# Patient Record
Sex: MALE | Race: WHITE | NOT HISPANIC OR LATINO | Employment: FULL TIME | URBAN - METROPOLITAN AREA
[De-identification: names, ages, dates, MRNs, and addresses within clinical notes are randomized per-mention and may not be internally consistent; named-entity substitution may affect disease eponyms.]

---

## 2018-11-23 ENCOUNTER — HISTORICAL (OUTPATIENT)
Dept: RADIOLOGY | Facility: HOSPITAL | Age: 58
End: 2018-11-23

## 2019-05-09 ENCOUNTER — HISTORICAL (OUTPATIENT)
Dept: LAB | Facility: HOSPITAL | Age: 59
End: 2019-05-09

## 2019-05-09 LAB
EST. AVERAGE GLUCOSE BLD GHB EST-MCNC: 111 MG/DL
HBA1C MFR BLD: 5.5 % (ref 4.2–6.3)

## 2019-10-30 ENCOUNTER — HISTORICAL (OUTPATIENT)
Dept: RADIOLOGY | Facility: HOSPITAL | Age: 59
End: 2019-10-30

## 2019-10-30 LAB
BUN SERPL-MCNC: 10 MG/DL (ref 7–18)
CALCIUM SERPL-MCNC: 9 MG/DL (ref 8.5–10.1)
CHLORIDE SERPL-SCNC: 102 MMOL/L (ref 98–107)
CO2 SERPL-SCNC: 28 MMOL/L (ref 21–32)
CREAT SERPL-MCNC: 0.82 MG/DL (ref 0.7–1.3)
CREAT/UREA NIT SERPL: 12.2
GLUCOSE SERPL-MCNC: 87 MG/DL (ref 74–106)
POC CREATININE: 0.8 MG/DL (ref 0.6–1.3)
POTASSIUM SERPL-SCNC: 4.1 MMOL/L (ref 3.5–5.1)
SODIUM SERPL-SCNC: 138 MMOL/L (ref 136–145)

## 2021-05-17 ENCOUNTER — HISTORICAL (OUTPATIENT)
Dept: ADMINISTRATIVE | Facility: HOSPITAL | Age: 61
End: 2021-05-17

## 2021-05-17 LAB
ABS NEUT (OLG): 3.6 X10(3)/MCL (ref 2.1–9.2)
ALBUMIN SERPL-MCNC: 4.5 GM/DL (ref 3.4–5)
ALBUMIN/GLOB SERPL: 1.8 {RATIO} (ref 1.5–2.5)
ALP SERPL-CCNC: 55 UNIT/L (ref 38–126)
ALT SERPL-CCNC: 17 UNIT/L (ref 7–52)
AST SERPL-CCNC: 22 UNIT/L (ref 15–37)
BILIRUB SERPL-MCNC: 0.7 MG/DL (ref 0.2–1)
BILIRUBIN DIRECT+TOT PNL SERPL-MCNC: 0.2 MG/DL (ref 0–0.5)
BILIRUBIN DIRECT+TOT PNL SERPL-MCNC: 0.5 MG/DL
BUN SERPL-MCNC: 11 MG/DL (ref 7–18)
CALCIUM SERPL-MCNC: 9.2 MG/DL (ref 8.5–10.1)
CHLORIDE SERPL-SCNC: 102 MMOL/L (ref 98–107)
CHOLEST SERPL-MCNC: 186 MG/DL (ref 0–200)
CHOLEST/HDLC SERPL: 4.5 {RATIO}
CO2 SERPL-SCNC: 27 MMOL/L (ref 21–32)
CREAT SERPL-MCNC: 0.8 MG/DL (ref 0.6–1.3)
DEPRECATED CALCIDIOL+CALCIFEROL SERPL-MC: 28.5 NG/ML (ref 30–80)
ERYTHROCYTE [DISTWIDTH] IN BLOOD BY AUTOMATED COUNT: 12.3 % (ref 11.5–17)
EST CREAT CLEARANCE SER (OHS): 158.02 ML/MIN
GLOBULIN SER-MCNC: 2.5 GM/DL (ref 1.2–3)
GLUCOSE SERPL-MCNC: 102 MG/DL (ref 74–106)
HCT VFR BLD AUTO: 44.8 % (ref 42–52)
HDLC SERPL-MCNC: 41 MG/DL (ref 35–60)
HGB BLD-MCNC: 15.1 GM/DL (ref 14–18)
LDLC SERPL CALC-MCNC: 128 MG/DL (ref 0–129)
LYMPHOCYTES # BLD AUTO: 2.5 X10(3)/MCL (ref 0.6–3.4)
LYMPHOCYTES NFR BLD AUTO: 38 % (ref 13–40)
MCH RBC QN AUTO: 28 PG (ref 27–31.2)
MCHC RBC AUTO-ENTMCNC: 34 GM/DL (ref 32–36)
MCV RBC AUTO: 83 FL (ref 80–94)
MONOCYTES # BLD AUTO: 0.5 X10(3)/MCL (ref 0.1–1.3)
MONOCYTES NFR BLD AUTO: 7.8 % (ref 0.1–24)
NEUTROPHILS NFR BLD AUTO: 54.2 % (ref 47–80)
PLATELET # BLD AUTO: 233 X10(3)/MCL (ref 130–400)
PMV BLD AUTO: 8.9 FL (ref 9.4–12.4)
POTASSIUM SERPL-SCNC: 4.4 MMOL/L (ref 3.5–5.1)
PROT SERPL-MCNC: 7 GM/DL (ref 6.4–8.2)
PSA SERPL-MCNC: 2.18 NG/ML (ref 0–3.5)
RBC # BLD AUTO: 5.4 X10(6)/MCL (ref 4.7–6.1)
SODIUM SERPL-SCNC: 140 MMOL/L (ref 136–145)
TRIGL SERPL-MCNC: 205 MG/DL (ref 30–150)
TSH SERPL-ACNC: 1.44 MIU/ML (ref 0.35–4.94)
VLDLC SERPL CALC-MCNC: 41 MG/DL
WBC # SPEC AUTO: 6.6 X10(3)/MCL (ref 4.5–11.5)

## 2021-09-29 ENCOUNTER — HISTORICAL (OUTPATIENT)
Dept: ADMINISTRATIVE | Facility: HOSPITAL | Age: 61
End: 2021-09-29

## 2022-04-09 ENCOUNTER — HISTORICAL (OUTPATIENT)
Dept: ADMINISTRATIVE | Facility: HOSPITAL | Age: 62
End: 2022-04-09

## 2022-04-26 VITALS
WEIGHT: 264.56 LBS | SYSTOLIC BLOOD PRESSURE: 146 MMHG | HEIGHT: 69 IN | BODY MASS INDEX: 39.18 KG/M2 | DIASTOLIC BLOOD PRESSURE: 84 MMHG | OXYGEN SATURATION: 95 %

## 2022-05-02 NOTE — HISTORICAL [facility] CERNER
This is a historical note converted from Robert. Formatting and pictures may have been removed.  Please reference Robert for original formatting and attached multimedia. Chief Complaint  wellness  History of Present Illness  Dayana brother  Works as a  at [employer] ([place]?previously worked at [employer] for many years  was?recently put on venlafaxine for mild?anxiety and depression?from dr long in [place]  took it?for days but discontinued it due to nausea and dizziness  home BPs around 140/80  did colonoscopy and egd with dr monzon?but does not know if he is due for repeat:  seeing dr cornell for right knee pain test  Not really exercising due to knee pain  Diet is variable,?was on a low-carb diet?but?has not been doing anything in particular lately  Due for PSA  has constant fatigue, excessive daytime sleepiness,?trouble staying awake although he is able to drive without problem,?loud snoring  States he was diagnosed with thyroid trouble in the past and previously took medicine?but that it seemed to resolve on its own so he was able to get off of the medicine but has not been checked in a long time  States he was diagnosed with vitamin D deficiency in the past but apparently there was a?difficult time?keeping his level up without the 50,000 unit dose, may need to stay on that  Review of Systems  ?14 point Review of Systems performed with no exceptions for new complaints other than as noted in HPI.  Physical Exam  Vitals & Measurements  HR:?78(Peripheral)? BP:?152/90?  HT:?175.00?cm? HT:?175?cm? WT:?111.000?kg? WT:?111?kg? BMI:?36.24?  ?  PHYSICAL EXAM  ?   WDWN patient in NAD, ?AFVSS  HEENT - no acute abnormality  ??????????????? oropharynx WNL  HEART - RRR  LUNGS -? CTA  ABDOMEN - benign, NTND;? no peritoneal signs  EXTREMITIES - No CCE  SKIN - warm, dry, intact  PSYCH - affect appropriate;? alert and oriented  NEURO- no new deficits noted;? cranial nerves grossly intact  ?  Assessment/Plan  1.?Wellness  examination?Z00.00  ?Work on diet and exercise  Check on: Status  Check PSA today  Ordered:  Comprehensive Metabolic Panel, Routine collect, 05/17/21 9:27:00 CDT, Blood, Stop date 05/17/21 9:27:00 CDT, Lab Collect, Wellness examination, 05/17/21 9:27:00 CDT  Lipid Panel, Routine collect, 05/17/21 9:27:00 CDT, Blood, Stop date 05/17/21 9:27:00 CDT, Lab Collect, Wellness examination, 05/17/21 9:27:00 CDT  Preventative Health Care Est 40-64 years [id number] PC, Wellness examination, [facility] AMB - AFP, 05/17/21 9:22:00 CDT  Prostate Specific Antigen, Routine collect, 05/17/21 9:27:00 CDT, Blood, Stop date 05/17/21 9:27:00 CDT, Lab Collect, Wellness examination, 05/17/21 9:27:00 CDT  Thyroid Stimulating Hormone, Routine collect, 05/17/21 9:27:00 CDT, Blood, Stop date 05/17/21 9:27:00 CDT, Lab Collect, Wellness examination  Hypothyroidism, 05/17/21 9:27:00 CDT  ?  2.?Elevated blood pressure reading without diagnosis of hypertension?R03.0,?Elevated BP without diagnosis of hypertension?R03.0  ?Home blood pressures,?will have him come back in?about a month and see how he is doing  Ordered:  Clinic Follow up, *Est. 06/28/21 14:00:00 CDT, Order for future visit, Elevated blood pressure reading without diagnosis of hypertension, HLink AFP  ?  3.?GERD (gastroesophageal reflux disease)?K21.9  ?  4.?Anxiety with depression?F41.8  ?Try restarting venlafaxine at every other day?then increase to daily as tolerated  ?  6.?Hypothyroidism?E03.9  ?We will recheck?TSH  Ordered:  Thyroid Stimulating Hormone, Routine collect, 05/17/21 9:27:00 CDT, Blood, Stop date 05/17/21 9:27:00 CDT, Lab Collect, Wellness examination  Hypothyroidism, 05/17/21 9:27:00 CDT  ?  7.?Vitamin D deficiency?E55.9  ?Read?check level, may need to just stay on 50,000 units weekly?if he is low again  Ordered:  Vitamin D, 25-Hydroxy Level, Routine collect, 05/17/21 9:27:00 CDT, Blood, Stop date 05/17/21 9:27:00 CDT, Lab Collect, Vitamin D deficiency, 05/17/21 9:27:00  CDT  ?  Referrals  External Referral, fatigue, home sleep study, usual, 05/17/21 10:17:00 CDT, Fatigue  Clinic Follow up, *Est. 06/28/21 14:00:00 CDT, Order for future visit, Elevated blood pressure reading without diagnosis of hypertension, [facility] AFP   Problem List/Past Medical History  Ongoing  2019-nCoV  Anxiety with depression  De Quervains tenosynovitis, right  Elevated blood pressure reading without diagnosis of hypertension  Elevated BP without diagnosis of hypertension  GERD (gastroesophageal reflux disease)  Hypothyroidism  Obesity  Screening for cardiovascular condition  Screening for prostate cancer  Tendinitis of thumb  Vitamin D deficiency  Wellness examination  Historical  No qualifying data  Procedure/Surgical History  Colonoscopy (2015)  Colonoscopy (2015)  Arm  Dislocation, jaw  Injury, knee  Lesser toe structure   Medications  Fish Oil 1200 mg oral capsule, 1200 mg= 1 cap(s), Oral, TID  Pantoprazole 40 mg ORAL EC-Tablet, 40 mg= 1 tab(s), Oral, Daily  Vitamin D3 5000 intl units (125 mcg) oral capsule, 5000 IntUnit= 1 cap(s), Oral, Daily  Zofran 8 mg oral tablet, 8 mg= 1 tab(s), Oral, BID, PRN, 1 refills  Allergies  codeine?(Itching)  Social History  Abuse/Neglect  No, No, Yes, 05/17/2021  Alcohol  Never, 01/22/2016  Employment/School  Employed, 01/22/2016  Exercise  Exercise duration: 45. Exercise type: Walking., 01/22/2016  Financial/Legal Situation  None, 04/27/2021  Home/Environment  Lives with Children, Spouse., 01/22/2016  Nutrition/Health  Type of diet: Regular Diet. Regular, 01/22/2016  Sexual  Sexually active: Yes., 01/22/2016  Spiritual/Cultural  None, 04/27/2021  Substance Use  Never, 01/22/2016  Tobacco  Never (less than 100 in lifetime), N/A, Smokeless Tobacco Use: Former smokeless tobacco user, quit more than 30 days ago., 05/17/2021  Family History  COPD (chronic obstructive pulmonary disease).: Mother.  Diabetes mellitus type 2: Mother.  Hyperlipidemia.: Mother.  Hypertension.:  Father.  Immunizations  Vaccine Date Status   influenza virus vaccine, inactivated 01/22/2016 Given   Health Maintenance  Health Maintenance  ???Pending?(in the next year)  ??? ??OverDue  ??? ? ? ?Influenza Vaccine due??10/01/20??and every 1??day(s)  ??? ??Due?  ??? ? ? ?Lipid Screening due??05/17/21??Unknown Frequency  ??? ? ? ?Zoster Vaccine due??05/17/21??Unknown Frequency  ??? ??Refused?  ??? ? ? ?Tetanus Vaccine due??05/17/21??and every 10??year(s)  ??? ??Due In Future?  ??? ? ? ?Obesity Screening not due until??01/01/22??and every 1??year(s)  ??? ? ? ?Alcohol Misuse Screening not due until??01/02/22??and every 1??year(s)  ??? ? ? ?Depression Screening not due until??04/27/22??and every 1??year(s)  ??? ? ? ?Aspirin Therapy for CVD Prevention not due until??04/27/22??and every 1??year(s)  ??? ? ? ?ADL Screening not due until??04/27/22??and every 1??year(s)  ???Satisfied?(in the past 1 year)  ??? ??Satisfied?  ??? ? ? ?ADL Screening on??04/27/21.??Satisfied by Edda Olguin LPN  ??? ? ? ?Alcohol Misuse Screening on??04/27/21.??Satisfied by Edda Olguin LPN  ??? ? ? ?Aspirin Therapy for CVD Prevention on??04/27/21.??Satisfied by Edda Olguin LPN  ??? ? ? ?Blood Pressure Screening on??05/17/21.??Satisfied by Mihir Lobato MD  ??? ? ? ?Body Mass Index Check on??05/17/21.??Satisfied by Maria Esther Dowell LPN  ??? ? ? ?Depression Screening on??04/27/21.??Satisfied by Edda Olguin LPN  ??? ? ? ?Obesity Screening on??05/17/21.??Satisfied by Maria Esther Dowell LPN  ??? ??Refused?  ??? ? ? ?Tetanus Vaccine on??04/27/21.??Recorded by Edda Olguin LPN  ??? ? ? ?Zoster Vaccine on??04/27/21.??Recorded by Edda Olguin LPN  ?

## 2022-05-18 PROBLEM — F41.8 MIXED ANXIETY DEPRESSIVE DISORDER: Status: ACTIVE | Noted: 2022-05-18

## 2022-05-18 PROBLEM — K21.9 GASTROESOPHAGEAL REFLUX DISEASE: Status: ACTIVE | Noted: 2022-05-18

## 2022-05-18 PROBLEM — E03.9 HYPOTHYROIDISM: Status: ACTIVE | Noted: 2022-05-18

## 2022-05-18 PROBLEM — M65.4 RADIAL STYLOID TENOSYNOVITIS OF RIGHT HAND: Status: ACTIVE | Noted: 2022-05-18

## 2022-05-18 PROBLEM — E55.9 VITAMIN D DEFICIENCY: Status: ACTIVE | Noted: 2022-05-18

## 2022-05-18 PROBLEM — E66.9 OBESITY: Status: ACTIVE | Noted: 2022-05-18

## 2022-05-19 PROBLEM — Z00.00 ENCOUNTER FOR WELLNESS EXAMINATION: Status: ACTIVE | Noted: 2022-05-19

## 2022-08-22 PROBLEM — Z00.00 ENCOUNTER FOR WELLNESS EXAMINATION: Status: RESOLVED | Noted: 2022-05-19 | Resolved: 2022-08-22

## 2023-06-12 PROBLEM — R60.9 EDEMA: Status: ACTIVE | Noted: 2023-06-12

## 2023-06-12 PROBLEM — G47.33 OSA ON CPAP: Status: ACTIVE | Noted: 2023-06-12

## 2023-06-12 PROBLEM — Z28.21 IMMUNIZATION DECLINED: Status: ACTIVE | Noted: 2023-06-12

## 2023-06-12 PROCEDURE — 86803 HEPATITIS C AB TEST: CPT | Performed by: FAMILY MEDICINE

## 2023-06-12 PROCEDURE — 87389 HIV-1 AG W/HIV-1&-2 AB AG IA: CPT | Performed by: FAMILY MEDICINE

## 2023-07-11 ENCOUNTER — OFFICE VISIT (OUTPATIENT)
Dept: ORTHOPEDICS | Facility: CLINIC | Age: 63
End: 2023-07-11
Payer: COMMERCIAL

## 2023-07-11 ENCOUNTER — HOSPITAL ENCOUNTER (OUTPATIENT)
Dept: RADIOLOGY | Facility: CLINIC | Age: 63
Discharge: HOME | End: 2023-07-11
Attending: SPECIALIST
Payer: COMMERCIAL

## 2023-07-11 VITALS
HEART RATE: 56 BPM | HEIGHT: 68 IN | SYSTOLIC BLOOD PRESSURE: 160 MMHG | DIASTOLIC BLOOD PRESSURE: 98 MMHG | WEIGHT: 246 LBS | BODY MASS INDEX: 37.28 KG/M2

## 2023-07-11 DIAGNOSIS — M25.561 RIGHT KNEE PAIN, UNSPECIFIED CHRONICITY: ICD-10-CM

## 2023-07-11 DIAGNOSIS — M25.561 RIGHT KNEE PAIN, UNSPECIFIED CHRONICITY: Primary | ICD-10-CM

## 2023-07-11 DIAGNOSIS — M17.11 PRIMARY OSTEOARTHRITIS OF RIGHT KNEE: ICD-10-CM

## 2023-07-11 PROCEDURE — 3077F SYST BP >= 140 MM HG: CPT | Mod: CPTII,,, | Performed by: PHYSICIAN ASSISTANT

## 2023-07-11 PROCEDURE — 3008F PR BODY MASS INDEX (BMI) DOCUMENTED: ICD-10-PCS | Mod: CPTII,,, | Performed by: PHYSICIAN ASSISTANT

## 2023-07-11 PROCEDURE — 3080F PR MOST RECENT DIASTOLIC BLOOD PRESSURE >= 90 MM HG: ICD-10-PCS | Mod: CPTII,,, | Performed by: PHYSICIAN ASSISTANT

## 2023-07-11 PROCEDURE — 3080F DIAST BP >= 90 MM HG: CPT | Mod: CPTII,,, | Performed by: PHYSICIAN ASSISTANT

## 2023-07-11 PROCEDURE — 1160F PR REVIEW ALL MEDS BY PRESCRIBER/CLIN PHARMACIST DOCUMENTED: ICD-10-PCS | Mod: CPTII,,, | Performed by: PHYSICIAN ASSISTANT

## 2023-07-11 PROCEDURE — 73564 XR KNEE COMP 4 OR MORE VIEWS RIGHT: ICD-10-PCS | Mod: RT,,, | Performed by: SPECIALIST

## 2023-07-11 PROCEDURE — 1159F MED LIST DOCD IN RCRD: CPT | Mod: CPTII,,, | Performed by: PHYSICIAN ASSISTANT

## 2023-07-11 PROCEDURE — 1159F PR MEDICATION LIST DOCUMENTED IN MEDICAL RECORD: ICD-10-PCS | Mod: CPTII,,, | Performed by: PHYSICIAN ASSISTANT

## 2023-07-11 PROCEDURE — 1160F RVW MEDS BY RX/DR IN RCRD: CPT | Mod: CPTII,,, | Performed by: PHYSICIAN ASSISTANT

## 2023-07-11 PROCEDURE — 99214 PR OFFICE/OUTPT VISIT, EST, LEVL IV, 30-39 MIN: ICD-10-PCS | Mod: ,,, | Performed by: PHYSICIAN ASSISTANT

## 2023-07-11 PROCEDURE — 99214 OFFICE O/P EST MOD 30 MIN: CPT | Mod: ,,, | Performed by: PHYSICIAN ASSISTANT

## 2023-07-11 PROCEDURE — 73564 X-RAY EXAM KNEE 4 OR MORE: CPT | Mod: RT,,, | Performed by: SPECIALIST

## 2023-07-11 PROCEDURE — 3008F BODY MASS INDEX DOCD: CPT | Mod: CPTII,,, | Performed by: PHYSICIAN ASSISTANT

## 2023-07-11 PROCEDURE — 3077F PR MOST RECENT SYSTOLIC BLOOD PRESSURE >= 140 MM HG: ICD-10-PCS | Mod: CPTII,,, | Performed by: PHYSICIAN ASSISTANT

## 2023-07-11 NOTE — PROGRESS NOTES
"Chief Complaint:   Chief Complaint   Patient presents with    Knee Pain     R knee pain. states pain has not stopped. has had sx years ago. celebrex prn. has been having constant swelling. denies fall/injury. due to his type of job he reports a lot of standing.       History of present illness:    This is a 62 y.o. year old male who complains of right knee pain   Has had previous knee reconstruction  Wants to discuss TKA    Review of Systems:    Constitution:   Denies chills, fever, and sweats.  HENT:   Denies headaches or blurry vision.  Cardiovascular:  Denies chest pain or irregular heart beat.  Respiratory:   Denies cough or shortness of breath.  Gastrointestinal:  Denies abdominal pain, nausea, or vomiting.  Musculoskeletal:   Denies muscle cramps.  Neurological:   Denies dizziness or focal weakness.  Psychiatric/Behavior: Normal mental status.  Hematology/Lymph:  Denies bleeding problem or easy bruising/bleeding.  Skin:    Denies rash or suspicious lesions.    Examination:    Vital Signs:    Vitals:    07/11/23 0803   BP: (!) 160/98   Pulse: (!) 56   Weight: 111.6 kg (246 lb)   Height: 5' 8" (1.727 m)       Body mass index is 37.4 kg/m².    Constitution:   Well-developed, well nourished patient in no acute distress.  Neurological:   Alert and oriented x 3 and cooperative to examination.     Psychiatric/Behavior: Normal mental status.  Respiratory:   No shortness of breath.  Eyes:    Extraoccular muscles intact  Skin:    No scars, rash or suspicious lesions.    Physical Exam:       General Musculoskeletal Exam   Gait: antalgic       left Knee Exam     Inspection   Erythema: absent  Effusion: present  Deformity: present  Bruising: absent    Tenderness   The patient is tender to palpation of the medial joint line    Crepitus   The patient has crepitus with ROM    Range of Motion   Extension: abnormal   Flexion: abnormal   5-125    Tests   Meniscus   Junie:  negative  Ligament Examination   MCL - Valgus: normal " (0 to 2mm)  LCL - Varus: normal  Patella   Passive Patellar Tilt: neutral    Other   Sensation: normal    Comments:  varus deformity    Muscle Strength   Right Lower Extremity   Quadriceps:  5/5   Hamstrin/5     Vascular Exam     Right Pulses  Dorsalis Pedis:      2+  Posterior Tibial:      2+      Imaging: X-rays ordered and images interpreted today personally by me of right knee 4 views  Bone on bone medial compartment  Intact hardware from previous knee construction        Assessment: Right knee pain, unspecified chronicity  -     X-Ray Knee Complete 4 Or More Views Right; Future; Expected date: 2023    Primary osteoarthritis of right knee         Plan:  TKA in December   Discussion of orthopedic surgery limitations and risks, benefits, alternatives, and complications of operative and nonoperative treatments were discussed with patient and family. They understand, agree and want to proceed with operation/procedure   Discussion of orthopedic surgery limitations and risks: recurrence of problem, pain, deformity: problems with prosthesis, prosthesis dislocation, prosthesis loosening, prosthesis failure, problems with implanted hardware, loosening of implanted hardware, failure of implanted hardware, reaction of soft tissue to implanted hardware, protrusion of implanted hardware, pain from implanted hardware, stiffness, nerve injury, vascular injury, skin necrosis, tendon adhesion.            DISCLAIMER: This note may have been dictated using voice recognition software and may contain grammatical errors.     NOTE: Consult report sent to referring provider via [facility] EMR.

## 2023-08-17 ENCOUNTER — TELEPHONE (OUTPATIENT)
Dept: ORTHOPEDICS | Facility: CLINIC | Age: 63
End: 2023-08-17
Payer: COMMERCIAL

## 2023-08-23 ENCOUNTER — OFFICE VISIT (OUTPATIENT)
Dept: ORTHOPEDICS | Facility: CLINIC | Age: 63
End: 2023-08-23
Payer: COMMERCIAL

## 2023-08-23 VITALS
SYSTOLIC BLOOD PRESSURE: 158 MMHG | BODY MASS INDEX: 37.71 KG/M2 | HEART RATE: 56 BPM | DIASTOLIC BLOOD PRESSURE: 83 MMHG | WEIGHT: 248.81 LBS | HEIGHT: 68 IN

## 2023-08-23 DIAGNOSIS — M17.11 PRIMARY OSTEOARTHRITIS OF RIGHT KNEE: Primary | ICD-10-CM

## 2023-08-23 PROCEDURE — 3079F PR MOST RECENT DIASTOLIC BLOOD PRESSURE 80-89 MM HG: ICD-10-PCS | Mod: CPTII,,, | Performed by: PHYSICIAN ASSISTANT

## 2023-08-23 PROCEDURE — 99214 OFFICE O/P EST MOD 30 MIN: CPT | Mod: ,,, | Performed by: PHYSICIAN ASSISTANT

## 2023-08-23 PROCEDURE — 1160F RVW MEDS BY RX/DR IN RCRD: CPT | Mod: CPTII,,, | Performed by: PHYSICIAN ASSISTANT

## 2023-08-23 PROCEDURE — 1160F PR REVIEW ALL MEDS BY PRESCRIBER/CLIN PHARMACIST DOCUMENTED: ICD-10-PCS | Mod: CPTII,,, | Performed by: PHYSICIAN ASSISTANT

## 2023-08-23 PROCEDURE — 3079F DIAST BP 80-89 MM HG: CPT | Mod: CPTII,,, | Performed by: PHYSICIAN ASSISTANT

## 2023-08-23 PROCEDURE — 1159F MED LIST DOCD IN RCRD: CPT | Mod: CPTII,,, | Performed by: PHYSICIAN ASSISTANT

## 2023-08-23 PROCEDURE — 99214 PR OFFICE/OUTPT VISIT, EST, LEVL IV, 30-39 MIN: ICD-10-PCS | Mod: ,,, | Performed by: PHYSICIAN ASSISTANT

## 2023-08-23 PROCEDURE — 3077F PR MOST RECENT SYSTOLIC BLOOD PRESSURE >= 140 MM HG: ICD-10-PCS | Mod: CPTII,,, | Performed by: PHYSICIAN ASSISTANT

## 2023-08-23 PROCEDURE — 3008F BODY MASS INDEX DOCD: CPT | Mod: CPTII,,, | Performed by: PHYSICIAN ASSISTANT

## 2023-08-23 PROCEDURE — 3008F PR BODY MASS INDEX (BMI) DOCUMENTED: ICD-10-PCS | Mod: CPTII,,, | Performed by: PHYSICIAN ASSISTANT

## 2023-08-23 PROCEDURE — 3077F SYST BP >= 140 MM HG: CPT | Mod: CPTII,,, | Performed by: PHYSICIAN ASSISTANT

## 2023-08-23 PROCEDURE — 1159F PR MEDICATION LIST DOCUMENTED IN MEDICAL RECORD: ICD-10-PCS | Mod: CPTII,,, | Performed by: PHYSICIAN ASSISTANT

## 2023-08-23 RX ORDER — MAGNESIUM 250 MG
250 TABLET ORAL DAILY PRN
COMMUNITY

## 2023-08-23 NOTE — PROGRESS NOTES
Chief Complaint:   Chief Complaint   Patient presents with    Right Knee - Pain, Follow-up    Knee Pain     Pt states he is present to discuss a date for his RT TKA. Pt was referred by Dr. Ann.       History of present illness:    62-year-old male presents office today with his wife regarding his right knee pain.  He is being referred by Dr. Ann.  He is a known history of advanced osteoarthritis.  He suffered injury at work back in the early 90s and underwent ligament reconstruction.  He did doing very well up until a couple years ago where he noted ongoing medial-sided knee pain.  His pain progressively worsened with weight-bearing activity.  He has had prior conservative treatment consisting of injections which no longer provide relief.  He is being sent over to discuss total knee arthroplasty    Past Medical History:   Diagnosis Date    Anxiety disorder, unspecified     Depression     Elevated blood pressure reading in office with diagnosis of hypertension     GERD (gastroesophageal reflux disease)     History of COVID-19     Synovitis and tenosynovitis, unspecified     Unspecified osteoarthritis, unspecified site     Vitamin D deficiency        Past Surgical History:   Procedure Laterality Date    arm surgery      KNEE SURGERY      lesser toe structure      MANDIBLE FRACTURE SURGERY         Current Outpatient Medications   Medication Sig    celecoxib (CELEBREX) 200 MG capsule Take 1 capsule (200 mg total) by mouth once daily.    cholecalciferol, vitamin D3, (VITAMIN D3) 25 mcg (1,000 unit) capsule Take 5,000 Int'l Units by mouth.    montelukast (SINGULAIR) 10 mg tablet Take 10 mg by mouth once daily.    pantoprazole (PROTONIX) 40 MG tablet Take 1 tablet (40 mg total) by mouth once daily.    magnesium 250 mg Tab Take 250 mg by mouth once.    omega 3-dha-epa-fish oil 1,200 (144-216) mg Cap Take 1,200 mg by mouth.     No current facility-administered medications for this visit.       Review of patient's  "allergies indicates:   Allergen Reactions    Codeine Itching       Family History   Problem Relation Age of Onset    Hyperlipidemia Mother     Diabetes Mother     COPD Mother     Hypertension Father        Social History     Socioeconomic History    Marital status:    Tobacco Use    Smoking status: Never    Smokeless tobacco: Never   Substance and Sexual Activity    Alcohol use: Yes     Alcohol/week: 4.0 standard drinks of alcohol     Types: 4 Cans of beer per week     Comment: Occasionally    Drug use: Never    Sexual activity: Yes     Partners: Female         Review of Systems:    Constitution:   Denies chills, fever, and sweats.  HENT:   Denies headaches or blurry vision.  Cardiovascular:  Denies chest pain or irregular heart beat.  Respiratory:   Denies cough or shortness of breath.  Gastrointestinal:  Denies abdominal pain, nausea, or vomiting.  Musculoskeletal:   Denies muscle cramps.  Neurological:   Denies dizziness or focal weakness.  Psychiatric/Behavior: Normal mental status.  Hematology/Lymph:  Denies bleeding problem or easy bruising/bleeding.  Skin:    Denies rash or suspicious lesions.    Examination:    Vital Signs:    Vitals:    08/23/23 0955 08/23/23 0956   BP:  (!) 158/83   Pulse:  (!) 56   Weight: 112.9 kg (248 lb 12.8 oz) 112.9 kg (248 lb 12.8 oz)   Height: 5' 8" (1.727 m) 5' 8" (1.727 m)   PainSc:    3       Body mass index is 37.83 kg/m².    Constitution:   Well-developed, well nourished patient in no acute distress.  Neurological:   Alert and oriented x 3 and cooperative to examination.     Psychiatric/Behavior: Normal mental status.  Respiratory:   No shortness of breath.  Nonlabored breathing  Cardiovascular:           Regular rate and rhythm  Eyes:    Extraoccular muscles intact  Skin:    No scars, rash or suspicious lesions.    Physical Exam:     Right Knee:     Grade effusion 1  Medial and lateral scars  No erythema or increased heat varus deformity    Patella demonstrated " crepitus.    Anteromedial aspect was tender on palpation. Medial aspect was tender on palpation. Medial collateral ligament was tender on palpation.    No lateral joint line tenderness   Active flexion 120 degrees   Active extension 5 degrees   antalgic gait was observed.     X-Ray Knee: A complete knee x-ray with standing for 4 views was reviewed of the right knee     IMPRESSIONS RADIOLOGY TEST   Narrowing of the joint space bone on bone in medial and patellofemoral compartments, and osteophytes arising from the knee.  Interferon screws seen in the distal femur    Kellgren Christopher grade 4 changes        Assessment:     Primary osteoarthritis of right knee  -     Case Request Operating Room - [facility]: ROBOTIC ARTHROPLASTY, KNEE, TOTAL  -     CT Knee w/o Contrast Right w/Oj Protocol; Future; Expected date: 08/23/2023        Plan:      Robotic assisted right total knee arthroplasty   He would like to get this done this winter.  We will get him scheduled in bring him back in preoperatively with Dr. Roberts to further discuss surgical intervention    The proposed procedure as well as associated risks/benefits were discussed at length with the patient and family with risks to include pain, bleeding, infection, future surgeries, neurovascular compromise, loss of limb, heart attack, stroke, deep vein thrombosis, and even death. They understand and agree with the treatment plan.        No follow-ups on file.    DISCLAIMER: This note may have been dictated using voice recognition software and may contain grammatical errors.

## 2023-11-01 ENCOUNTER — HOSPITAL ENCOUNTER (OUTPATIENT)
Dept: RADIOLOGY | Facility: HOSPITAL | Age: 63
Discharge: HOME | End: 2023-11-01
Attending: PHYSICIAN ASSISTANT
Payer: COMMERCIAL

## 2023-11-01 ENCOUNTER — OFFICE VISIT (OUTPATIENT)
Dept: ORTHOPEDICS | Facility: CLINIC | Age: 63
End: 2023-11-01
Payer: COMMERCIAL

## 2023-11-01 VITALS
SYSTOLIC BLOOD PRESSURE: 163 MMHG | HEART RATE: 62 BPM | BODY MASS INDEX: 37.59 KG/M2 | DIASTOLIC BLOOD PRESSURE: 92 MMHG | WEIGHT: 248 LBS | HEIGHT: 68 IN

## 2023-11-01 DIAGNOSIS — M17.11 PRIMARY OSTEOARTHRITIS OF RIGHT KNEE: ICD-10-CM

## 2023-11-01 DIAGNOSIS — R26.89 IMPAIRED GAIT AND MOBILITY: ICD-10-CM

## 2023-11-01 DIAGNOSIS — M17.11 PRIMARY OSTEOARTHRITIS OF RIGHT KNEE: Primary | ICD-10-CM

## 2023-11-01 DIAGNOSIS — Z01.812 PRE-OPERATIVE LABORATORY EXAMINATION: ICD-10-CM

## 2023-11-01 LAB — MRSA PCR SCRN (OHS): NOT DETECTED

## 2023-11-01 PROCEDURE — 1160F RVW MEDS BY RX/DR IN RCRD: CPT | Mod: CPTII,,, | Performed by: SPECIALIST

## 2023-11-01 PROCEDURE — 1160F PR REVIEW ALL MEDS BY PRESCRIBER/CLIN PHARMACIST DOCUMENTED: ICD-10-PCS | Mod: CPTII,,, | Performed by: SPECIALIST

## 2023-11-01 PROCEDURE — 1159F PR MEDICATION LIST DOCUMENTED IN MEDICAL RECORD: ICD-10-PCS | Mod: CPTII,,, | Performed by: SPECIALIST

## 2023-11-01 PROCEDURE — 3077F PR MOST RECENT SYSTOLIC BLOOD PRESSURE >= 140 MM HG: ICD-10-PCS | Mod: CPTII,,, | Performed by: SPECIALIST

## 2023-11-01 PROCEDURE — 73700 CT LOWER EXTREMITY W/O DYE: CPT | Mod: TC,RT

## 2023-11-01 PROCEDURE — 3008F PR BODY MASS INDEX (BMI) DOCUMENTED: ICD-10-PCS | Mod: CPTII,,, | Performed by: SPECIALIST

## 2023-11-01 PROCEDURE — 99214 OFFICE O/P EST MOD 30 MIN: CPT | Mod: ,,, | Performed by: SPECIALIST

## 2023-11-01 PROCEDURE — 1159F MED LIST DOCD IN RCRD: CPT | Mod: CPTII,,, | Performed by: SPECIALIST

## 2023-11-01 PROCEDURE — 71046 X-RAY EXAM CHEST 2 VIEWS: CPT | Mod: TC

## 2023-11-01 PROCEDURE — 3080F PR MOST RECENT DIASTOLIC BLOOD PRESSURE >= 90 MM HG: ICD-10-PCS | Mod: CPTII,,, | Performed by: SPECIALIST

## 2023-11-01 PROCEDURE — 3080F DIAST BP >= 90 MM HG: CPT | Mod: CPTII,,, | Performed by: SPECIALIST

## 2023-11-01 PROCEDURE — 99214 PR OFFICE/OUTPT VISIT, EST, LEVL IV, 30-39 MIN: ICD-10-PCS | Mod: ,,, | Performed by: SPECIALIST

## 2023-11-01 PROCEDURE — 3008F BODY MASS INDEX DOCD: CPT | Mod: CPTII,,, | Performed by: SPECIALIST

## 2023-11-01 PROCEDURE — 3077F SYST BP >= 140 MM HG: CPT | Mod: CPTII,,, | Performed by: SPECIALIST

## 2023-11-01 RX ORDER — CLINDAMYCIN HYDROCHLORIDE 300 MG/1
300 CAPSULE ORAL 3 TIMES DAILY
COMMUNITY
Start: 2023-10-31

## 2023-11-01 RX ORDER — GABAPENTIN 100 MG/1
300 CAPSULE ORAL
Status: CANCELLED | OUTPATIENT
Start: 2023-11-01

## 2023-11-01 RX ORDER — SODIUM CHLORIDE, SODIUM GLUCONATE, SODIUM ACETATE, POTASSIUM CHLORIDE AND MAGNESIUM CHLORIDE 30; 37; 368; 526; 502 MG/100ML; MG/100ML; MG/100ML; MG/100ML; MG/100ML
INJECTION, SOLUTION INTRAVENOUS CONTINUOUS
Status: CANCELLED | OUTPATIENT
Start: 2023-11-01

## 2023-11-01 RX ORDER — KETOROLAC TROMETHAMINE 10 MG/1
10 TABLET, FILM COATED ORAL
Status: CANCELLED | OUTPATIENT
Start: 2023-11-01 | End: 2023-11-01

## 2023-11-01 RX ORDER — SCOLOPAMINE TRANSDERMAL SYSTEM 1 MG/1
1 PATCH, EXTENDED RELEASE TRANSDERMAL ONCE AS NEEDED
Status: CANCELLED | OUTPATIENT
Start: 2023-11-01 | End: 2035-03-29

## 2023-11-01 RX ORDER — ACETAMINOPHEN 500 MG
1000 TABLET ORAL
Status: CANCELLED | OUTPATIENT
Start: 2023-11-01

## 2023-11-01 RX ORDER — TRANEXAMIC ACID 650 MG/1
1950 TABLET ORAL
Status: CANCELLED | OUTPATIENT
Start: 2023-11-01 | End: 2023-11-01

## 2023-11-01 RX ORDER — ONDANSETRON 4 MG/1
4 TABLET, ORALLY DISINTEGRATING ORAL
Status: CANCELLED | OUTPATIENT
Start: 2023-11-01

## 2023-11-01 NOTE — H&P
Past Medical History:   Diagnosis Date    Anxiety disorder, unspecified     Depression     Elevated blood pressure reading in office with diagnosis of hypertension     GERD (gastroesophageal reflux disease)     History of COVID-19     Synovitis and tenosynovitis, unspecified     Unspecified osteoarthritis, unspecified site     Vitamin D deficiency        Past Surgical History:   Procedure Laterality Date    arm surgery      FRACTURE SURGERY  1977,1981, 1992    Broken Arm, Broken Jaw , & Knee    HERNIA REPAIR  12/2020    KNEE SURGERY      lesser toe structure      MANDIBLE FRACTURE SURGERY         Current Outpatient Medications   Medication Sig    celecoxib (CELEBREX) 200 MG capsule Take 1 capsule (200 mg total) by mouth once daily.    cholecalciferol, vitamin D3, (VITAMIN D3) 25 mcg (1,000 unit) capsule Take 5,000 Int'l Units by mouth.    clindamycin (CLEOCIN) 300 MG capsule Take 300 mg by mouth 3 (three) times daily.    magnesium 250 mg Tab Take 250 mg by mouth once.    montelukast (SINGULAIR) 10 mg tablet Take 10 mg by mouth once daily.    omega 3-dha-epa-fish oil 1,200 (144-216) mg Cap Take 1,200 mg by mouth.    pantoprazole (PROTONIX) 40 MG tablet Take 1 tablet (40 mg total) by mouth once daily.     No current facility-administered medications for this visit.       Review of patient's allergies indicates:   Allergen Reactions    Codeine Itching       Family History   Problem Relation Age of Onset    Hyperlipidemia Mother     Diabetes Mother     COPD Mother     Hypertension Mother     Hypertension Father     Arthritis Sister        Social History     Socioeconomic History    Marital status:    Tobacco Use    Smoking status: Never    Smokeless tobacco: Never   Substance and Sexual Activity    Alcohol use: Yes     Alcohol/week: 4.0 standard drinks of alcohol     Types: 4 Cans of beer per week     Comment: Occasionally    Drug use: Never    Sexual activity: Yes     Partners: Female     Birth  "control/protection: None       Chief Complaint:   Chief Complaint   Patient presents with    Pre-op Exam     Pre Op Right TKA NESTOR 11/21/23       Consulting Physician: No ref. provider found    History of present illness:    This is a 62 y.o. year old male who complains of right knee pain since an injury at work when he was run over by a fair way mower at the [employer] in 1983.  He had a multi ligamentous knee reconstruction by Dr. Ann.  He has gone onto develop posttraumatic osteoarthrosis over the years and has had multiple rounds of physical therapy and corticosteroid injections with no relief of his pain.  NSAIDs do not help.  He is here for preop visit for right total knee arthroplasty today.      Review of Systems:    Constitution:   Denies chills, fever, and sweats.  HENT:   Denies headaches or blurry vision.  Cardiovascular:  Denies chest pain or irregular heart beat.  Respiratory:   Denies cough or shortness of breath.  Gastrointestinal:  Denies abdominal pain, nausea, or vomiting.  Musculoskeletal:   Denies muscle cramps.  Neurological:   Denies dizziness or focal weakness.  Psychiatric/Behavior: Normal mental status.  Hematology/Lymph:  Denies bleeding problem or easy bruising/bleeding.  Skin:    Denies rash or suspicious lesions.    Examination:    Vital Signs:    Vitals:    11/01/23 0935   BP: (!) 163/92   Pulse: 62   Weight: 112.5 kg (248 lb)   Height: 5' 8" (1.727 m)       Body mass index is 37.71 kg/m².    Constitution:   Well-developed, well nourished patient in no acute distress.  Neurological:   Alert and oriented x 3 and cooperative to examination.     Psychiatric/Behavior: Normal mental status.  Respiratory:   No shortness of breath.non labored breathing.  Cardiovascular: Regular rate and rhythm  Eyes:    Extraoccular muscles intact  Skin:    No scars, rash or suspicious lesions.    Physical Exam: Physical exam  General exam:  Well groomed, well nourished, no acute distress  Alert and " oriented x3  HEENT:  Pupils are equal, round, and reactive to light, normocephalic, atraumatic  Cardiovascular:  S1 and S2 heard, no murmurs  Pulmonary:  Lungs clear to auscultation bilaterally  Gastrointestinal:  Positive bowel sounds, soft, nontender, nondistended        General Musculoskeletal Exam   Gait: antalgic       Right Knee Exam     Inspection   Erythema: absent  Effusion: present  Deformity: present  Bruising: absent    Tenderness   The patient is tender to palpation of the medial joint line, MCL, condyle and medial retinaculum.    Crepitus   The patient has crepitus of the medial joint line.    Range of Motion   Extension: abnormal   Flexion: abnormal   5° to 125°    Tests   Meniscus   Junie:  Medial - positive Lateral - negative  Ligament Examination   MCL - Valgus: normal (0 to 2mm)  LCL - Varus: normal  Patella   Passive Patellar Tilt: neutral    Other   Sensation: normal    Comments:  Varus deformity    Muscle Strength   Right Lower Extremity   Quadriceps:  5/5   Hamstrin/5     Vascular Exam     Right Pulses  Dorsalis Pedis:      2+  Posterior Tibial:      2+    Well-healed remodeled far medial and far lateral skin incisions from his open ligamentous reconstruction from the .  Plan will be for an anterior midline incision for the total knee arthroplasty.          Imaging: X-rays reviewed and images interpreted today personally by me of four views of the right knee which show grade 4 changes and sclerosis in the medial compartment of the right knee in which he is bone-on-bone in the medial compartment with varus deformity and tricompartmental osteophytes.  Sclerosis in the medial compartment on the femur and the tibia.  Interference screw from ACL reconstruction is noted in the femur.  Impression: Advanced osteoarthritis right knee  .       Assessment: Primary osteoarthritis of right knee    Impaired gait and mobility    Pre-operative laboratory examination        Plan:  Robotic assisted  right total knee arthroplasty    Risks, benefits, alternatives, and complications were explained to the patient and/or patient representative. They understand, agree, and want to proceed with the operation/ procedure.        DISCLAIMER: This note may have been dictated using voice recognition software and may contain grammatical errors.     NOTE: Consult report sent to referring provider via [employer] [facility].

## 2023-11-13 ENCOUNTER — TELEPHONE (OUTPATIENT)
Dept: ORTHOPEDICS | Facility: CLINIC | Age: 63
End: 2023-11-13
Payer: COMMERCIAL

## 2023-11-13 NOTE — TELEPHONE ENCOUNTER
Patient came by the office to state that he was recently diagnosed with oral cancer. He would like to know if he can still proceed with RT TKA SX. 11/21/23.     I spoke with Dr. Roberts and he stated to cancel the RT TKA NESTOR 11/21/23 and take care of the oral cancer situation.     I called the patient to relay this message. He verbalized a clear understanding and stated that he will call back later once he has medical clearance to reschedule.